# Patient Record
Sex: MALE | Race: WHITE | NOT HISPANIC OR LATINO | URBAN - METROPOLITAN AREA
[De-identification: names, ages, dates, MRNs, and addresses within clinical notes are randomized per-mention and may not be internally consistent; named-entity substitution may affect disease eponyms.]

---

## 2017-04-25 ENCOUNTER — APPOINTMENT (RX ONLY)
Dept: URBAN - METROPOLITAN AREA CLINIC 97 | Facility: CLINIC | Age: 57
Setting detail: DERMATOLOGY
End: 2017-04-25

## 2017-04-25 DIAGNOSIS — L81.4 OTHER MELANIN HYPERPIGMENTATION: ICD-10-CM

## 2017-04-25 DIAGNOSIS — L82.0 INFLAMED SEBORRHEIC KERATOSIS: ICD-10-CM

## 2017-04-25 DIAGNOSIS — L71.8 OTHER ROSACEA: ICD-10-CM

## 2017-04-25 PROCEDURE — ? COUNSELING

## 2017-04-25 PROCEDURE — 99202 OFFICE O/P NEW SF 15 MIN: CPT | Mod: 25

## 2017-04-25 PROCEDURE — 17110 DESTRUCTION B9 LES UP TO 14: CPT

## 2017-04-25 PROCEDURE — ? BENIGN DESTRUCTION

## 2017-04-25 ASSESSMENT — LOCATION DETAILED DESCRIPTION DERM
LOCATION DETAILED: RIGHT LATERAL MALAR CHEEK
LOCATION DETAILED: LEFT CENTRAL MALAR CHEEK
LOCATION DETAILED: RIGHT INFERIOR CENTRAL MALAR CHEEK
LOCATION DETAILED: RIGHT INFERIOR LATERAL MALAR CHEEK
LOCATION DETAILED: INFERIOR MID FOREHEAD
LOCATION DETAILED: RIGHT MEDIAL SUPERIOR CHEST
LOCATION DETAILED: LEFT INFERIOR CENTRAL MALAR CHEEK

## 2017-04-25 ASSESSMENT — LOCATION SIMPLE DESCRIPTION DERM
LOCATION SIMPLE: RIGHT CHEEK
LOCATION SIMPLE: INFERIOR FOREHEAD
LOCATION SIMPLE: LEFT CHEEK
LOCATION SIMPLE: CHEST

## 2017-04-25 ASSESSMENT — LOCATION ZONE DERM
LOCATION ZONE: TRUNK
LOCATION ZONE: FACE

## 2017-04-25 NOTE — PROCEDURE: BENIGN DESTRUCTION
Include Z78.9 (Other Specified Conditions Influencing Health Status) As An Associated Diagnosis?: No
Treatment Number (Will Not Render If 0): 0
Medical Necessity Clause: This procedure was medically necessary because the lesions that were treated were:
Medical Necessity Information: It is in your best interest to select a reason for this procedure from the list below. All of these items fulfill various CMS LCD requirements except the new and changing color options.
Post-Care Instructions: I reviewed with the patient in detail post-care instructions. Patient is to wear sunprotection, and avoid picking at any of the treated lesions. Pt may apply Vaseline to crusted or scabbing areas.
Render Post-Care Instructions In Note?: yes
Detail Level: Zone
Consent: The patient's consent was obtained including but not limited to risks of crusting, scabbing, blistering, scarring, darker or lighter pigmentary change, recurrence, incomplete removal and infection.

## 2017-05-18 ENCOUNTER — APPOINTMENT (RX ONLY)
Dept: URBAN - METROPOLITAN AREA CLINIC 97 | Facility: CLINIC | Age: 57
Setting detail: DERMATOLOGY
End: 2017-05-18

## 2017-05-18 DIAGNOSIS — L71.8 OTHER ROSACEA: ICD-10-CM

## 2017-05-18 DIAGNOSIS — Z41.9 ENCOUNTER FOR PROCEDURE FOR PURPOSES OTHER THAN REMEDYING HEALTH STATE, UNSPECIFIED: ICD-10-CM

## 2017-05-18 DIAGNOSIS — B07.8 OTHER VIRAL WARTS: ICD-10-CM

## 2017-05-18 PROCEDURE — 17111 DESTRUCTION B9 LESIONS 15/>: CPT

## 2017-05-18 PROCEDURE — ? MEDICAL CONSULTATION: PULSED-DYE LASER

## 2017-05-18 PROCEDURE — ? LIQUID NITROGEN

## 2017-05-18 PROCEDURE — ? COUNSELING

## 2017-05-18 PROCEDURE — ? TREATMENT REGIMEN

## 2017-05-18 PROCEDURE — ? CHEMICAL PEEL JESSNER/TCA

## 2017-05-18 PROCEDURE — 99214 OFFICE O/P EST MOD 30 MIN: CPT | Mod: 25

## 2017-05-18 ASSESSMENT — LOCATION ZONE DERM
LOCATION ZONE: HAND
LOCATION ZONE: FACE
LOCATION ZONE: ARM

## 2017-05-18 ASSESSMENT — LOCATION SIMPLE DESCRIPTION DERM
LOCATION SIMPLE: RIGHT HAND
LOCATION SIMPLE: LEFT ELBOW
LOCATION SIMPLE: RIGHT FOREARM
LOCATION SIMPLE: RIGHT UPPER ARM
LOCATION SIMPLE: RIGHT WRIST
LOCATION SIMPLE: LEFT CHEEK
LOCATION SIMPLE: LEFT UPPER ARM
LOCATION SIMPLE: LEFT HAND
LOCATION SIMPLE: LEFT FOREARM

## 2017-05-18 ASSESSMENT — LOCATION DETAILED DESCRIPTION DERM
LOCATION DETAILED: LEFT ULNAR DORSAL HAND
LOCATION DETAILED: LEFT PROXIMAL DORSAL FOREARM
LOCATION DETAILED: RIGHT PROXIMAL POSTERIOR UPPER ARM
LOCATION DETAILED: RIGHT PROXIMAL DORSAL FOREARM
LOCATION DETAILED: LEFT DORSAL MIDDLE METACARPOPHALANGEAL JOINT
LOCATION DETAILED: LEFT SUPERIOR MEDIAL BUCCAL CHEEK
LOCATION DETAILED: RIGHT DISTAL DORSAL FOREARM
LOCATION DETAILED: LEFT CENTRAL MALAR CHEEK
LOCATION DETAILED: LEFT DISTAL DORSAL FOREARM
LOCATION DETAILED: RIGHT DISTAL POSTERIOR UPPER ARM
LOCATION DETAILED: RIGHT ULNAR DORSAL HAND
LOCATION DETAILED: 2ND WEB SPACE LEFT HAND
LOCATION DETAILED: LEFT ELBOW
LOCATION DETAILED: RIGHT RADIAL DORSAL HAND
LOCATION DETAILED: RIGHT PROXIMAL RADIAL DORSAL FOREARM
LOCATION DETAILED: RIGHT DORSAL WRIST
LOCATION DETAILED: LEFT PROXIMAL ULNAR DORSAL FOREARM
LOCATION DETAILED: LEFT DISTAL POSTERIOR UPPER ARM

## 2017-05-18 ASSESSMENT — AREA OF WARTS IN CM2: TOTAL AREA OF ALL WARTS IN CM2: 20

## 2017-05-18 NOTE — PROCEDURE: MIPS QUALITY
Detail Level: Zone
Quality 431: Preventive Care And Screening: Unhealthy Alcohol Use - Screening: Patient screened for unhealthy alcohol use using a single question and scores less than 2 times per year
Quality 226: Preventive Care And Screening: Tobacco Use: Screening And Cessation Intervention: Patient screened for tobacco and never smoked
Quality 130: Documentation Of Current Medications In The Medical Record: Current Medications not Documented, Patient not Eligible

## 2017-05-18 NOTE — PROCEDURE: LIQUID NITROGEN
Detail Level: Zone
Add 52 Modifier (Optional): no
Post-Care Instructions: I reviewed with the patient in detail post-care instructions. Patient is to wear sunprotection, and avoid picking at any of the treated lesions. Pt may apply Vaseline to crusted or scabbing areas.
Duration Of Freeze Thaw-Cycle (Seconds): 5
Total Number Of Lesions Treated: [address] [facility]
Number Of Freeze-Thaw Cycles: 2 freeze-thaw cycles
Medical Necessity Information: It is in your best interest to select a reason for this procedure from the list below. All of these items fulfill various CMS LCD requirements except the new and changing color options.
Consent: The patient's consent was obtained including but not limited to risks of crusting, scabbing, blistering, scarring, darker or lighter pigmentary change, recurrence, incomplete removal and infection.
Medical Necessity Clause: This procedure was medically necessary because the lesions that were treated were:

## 2017-08-16 ENCOUNTER — APPOINTMENT (RX ONLY)
Dept: URBAN - METROPOLITAN AREA CLINIC 97 | Facility: CLINIC | Age: 57
Setting detail: DERMATOLOGY
End: 2017-08-16

## 2017-08-16 DIAGNOSIS — B07.8 OTHER VIRAL WARTS: ICD-10-CM

## 2017-08-16 DIAGNOSIS — L71.8 OTHER ROSACEA: ICD-10-CM

## 2017-08-16 DIAGNOSIS — Z41.9 ENCOUNTER FOR PROCEDURE FOR PURPOSES OTHER THAN REMEDYING HEALTH STATE, UNSPECIFIED: ICD-10-CM

## 2017-08-16 PROCEDURE — ? CHEMICAL PEEL JESSNER/TCA

## 2017-08-16 PROCEDURE — ? PRESCRIPTION

## 2017-08-16 PROCEDURE — ? COUNSELING

## 2017-08-16 PROCEDURE — 17110 DESTRUCTION B9 LES UP TO 14: CPT

## 2017-08-16 PROCEDURE — 99213 OFFICE O/P EST LOW 20 MIN: CPT | Mod: 25

## 2017-08-16 PROCEDURE — ? LIQUID NITROGEN

## 2017-08-16 ASSESSMENT — LOCATION DETAILED DESCRIPTION DERM
LOCATION DETAILED: RIGHT LATERAL MALAR CHEEK
LOCATION DETAILED: LEFT PROXIMAL DORSAL FOREARM
LOCATION DETAILED: RIGHT VENTRAL PROXIMAL FOREARM
LOCATION DETAILED: LEFT VENTRAL DISTAL FOREARM
LOCATION DETAILED: RIGHT ANTERIOR DISTAL UPPER ARM
LOCATION DETAILED: LEFT VENTRAL PROXIMAL FOREARM
LOCATION DETAILED: RIGHT CENTRAL MALAR CHEEK
LOCATION DETAILED: LEFT INFERIOR CENTRAL MALAR CHEEK
LOCATION DETAILED: RIGHT INFERIOR CENTRAL MALAR CHEEK
LOCATION DETAILED: LEFT DISTAL POSTERIOR UPPER ARM
LOCATION DETAILED: LEFT CENTRAL MALAR CHEEK
LOCATION DETAILED: RIGHT PROXIMAL DORSAL FOREARM

## 2017-08-16 ASSESSMENT — LOCATION ZONE DERM
LOCATION ZONE: FACE
LOCATION ZONE: ARM
LOCATION ZONE: FACE

## 2017-08-16 ASSESSMENT — LOCATION SIMPLE DESCRIPTION DERM
LOCATION SIMPLE: LEFT FOREARM
LOCATION SIMPLE: RIGHT UPPER ARM
LOCATION SIMPLE: LEFT UPPER ARM
LOCATION SIMPLE: RIGHT CHEEK
LOCATION SIMPLE: RIGHT CHEEK
LOCATION SIMPLE: RIGHT FOREARM
LOCATION SIMPLE: LEFT CHEEK

## 2017-08-16 ASSESSMENT — AREA OF WARTS IN CM2: TOTAL AREA OF ALL WARTS IN CM2: 10

## 2017-08-16 NOTE — PROCEDURE: MIPS QUALITY
Quality 130: Documentation Of Current Medications In The Medical Record: Current Medications Documented
Quality 431: Preventive Care And Screening: Unhealthy Alcohol Use - Screening: Patient screened for unhealthy alcohol use using a single question and scores less than 2 times per year
Quality 402: Tobacco Use And Help With Quitting Among Adolescents: Patient screened for tobacco and never smoked
Detail Level: Zone

## 2017-08-16 NOTE — PROCEDURE: LIQUID NITROGEN
Medical Necessity Clause: This procedure was medically necessary because the lesions that were treated were:
Include Z78.9 (Other Specified Conditions Influencing Health Status) As An Associated Diagnosis?: No
Consent: The patient's consent was obtained including but not limited to risks of crusting, scabbing, blistering, scarring, darker or lighter pigmentary change, recurrence, incomplete removal and infection.
Detail Level: Zone
Number Of Freeze-Thaw Cycles: 1 freeze-thaw cycle
Duration Of Freeze Thaw-Cycle (Seconds): 5-10
Post-Care Instructions: I reviewed with the patient in detail post-care instructions. Patient is to wear sunprotection, and avoid picking at any of the treated lesions. Pt may apply Vaseline to crusted or scabbing areas.
Medical Necessity Information: It is in your best interest to select a reason for this procedure from the list below. All of these items fulfill various CMS LCD requirements except the new and changing color options.
Render Post-Care Instructions In Note?: yes

## 2017-08-24 RX ORDER — OXYMETAZOLINE HYDROCHLORIDE 10 MG/G
CREAM TOPICAL
Qty: 1 | Refills: 0

## 2017-11-09 ENCOUNTER — APPOINTMENT (RX ONLY)
Dept: URBAN - METROPOLITAN AREA CLINIC 97 | Facility: CLINIC | Age: 57
Setting detail: DERMATOLOGY
End: 2017-11-09

## 2017-11-09 DIAGNOSIS — L70.0 ACNE VULGARIS: ICD-10-CM

## 2017-11-09 PROCEDURE — ? ACNE SURGERY

## 2017-11-09 PROCEDURE — 10040 EXTRACTION: CPT

## 2017-11-09 ASSESSMENT — LOCATION DETAILED DESCRIPTION DERM: LOCATION DETAILED: LEFT CENTRAL MALAR CHEEK

## 2017-11-09 ASSESSMENT — LOCATION SIMPLE DESCRIPTION DERM: LOCATION SIMPLE: LEFT CHEEK

## 2017-11-09 ASSESSMENT — LOCATION ZONE DERM: LOCATION ZONE: FACE

## 2017-11-09 NOTE — PROCEDURE: ACNE SURGERY
Prep Text (Optional): Prior to removal the treatment areas were prepped in the usual fashion.
Acne Type: Comedonal Lesions
Extraction Method: cotton-tipped applicators and gentle pressure
Render Post-Care Instructions In Note?: no
Render Number Of Lesions Treated: yes
Post-Care Instructions: I reviewed with the patient in detail post-care instructions. Patient is to keep the treatment areas dry overnight, and then apply bacitracin twice daily until healed. Patient may apply hydrogen peroxide soaks to remove any crusting.
Consent was obtained and risks were reviewed including but not limited to scarring, infection, bleeding, scabbing, incomplete removal, and allergy to anesthesia.
Detail Level: Zone

## 2017-12-07 ENCOUNTER — APPOINTMENT (RX ONLY)
Dept: URBAN - METROPOLITAN AREA CLINIC 97 | Facility: CLINIC | Age: 57
Setting detail: DERMATOLOGY
End: 2017-12-07

## 2017-12-07 DIAGNOSIS — L70.0 ACNE VULGARIS: ICD-10-CM

## 2017-12-07 PROCEDURE — 10040 EXTRACTION: CPT

## 2017-12-07 PROCEDURE — ? ACNE SURGERY

## 2017-12-07 ASSESSMENT — LOCATION SIMPLE DESCRIPTION DERM: LOCATION SIMPLE: LEFT CHEEK

## 2017-12-07 ASSESSMENT — LOCATION DETAILED DESCRIPTION DERM: LOCATION DETAILED: LEFT LATERAL MALAR CHEEK

## 2017-12-07 ASSESSMENT — LOCATION ZONE DERM: LOCATION ZONE: FACE

## 2017-12-07 NOTE — PROCEDURE: ACNE SURGERY
Consent was obtained and risks were reviewed including but not limited to scarring, infection, bleeding, scabbing, incomplete removal, and allergy to anesthesia.
Extraction Method: cotton-tipped applicators and gentle pressure
Acne Type: Comedonal Lesions
Render Number Of Lesions Treated: yes
Post-Care Instructions: I reviewed with the patient in detail post-care instructions. Patient is to keep the treatment areas dry overnight, and then apply bacitracin twice daily until healed. Patient may apply hydrogen peroxide soaks to remove any crusting.
Detail Level: Zone
Prep Text (Optional): Prior to removal the treatment areas were prepped in the usual fashion.
Render Post-Care Instructions In Note?: no

## 2017-12-22 ENCOUNTER — APPOINTMENT (RX ONLY)
Dept: URBAN - METROPOLITAN AREA CLINIC 97 | Facility: CLINIC | Age: 57
Setting detail: DERMATOLOGY
End: 2017-12-22

## 2017-12-22 DIAGNOSIS — L71.8 OTHER ROSACEA: ICD-10-CM

## 2017-12-22 DIAGNOSIS — L81.4 OTHER MELANIN HYPERPIGMENTATION: ICD-10-CM

## 2017-12-22 DIAGNOSIS — D22 MELANOCYTIC NEVI: ICD-10-CM

## 2017-12-22 DIAGNOSIS — Z41.9 ENCOUNTER FOR PROCEDURE FOR PURPOSES OTHER THAN REMEDYING HEALTH STATE, UNSPECIFIED: ICD-10-CM

## 2017-12-22 DIAGNOSIS — B07.8 OTHER VIRAL WARTS: ICD-10-CM

## 2017-12-22 PROBLEM — D22.62 MELANOCYTIC NEVI OF LEFT UPPER LIMB, INCLUDING SHOULDER: Status: ACTIVE | Noted: 2017-12-22

## 2017-12-22 PROCEDURE — 99213 OFFICE O/P EST LOW 20 MIN: CPT | Mod: 25

## 2017-12-22 PROCEDURE — 17110 DESTRUCTION B9 LES UP TO 14: CPT

## 2017-12-22 PROCEDURE — ? PRESCRIPTION

## 2017-12-22 PROCEDURE — ? COUNSELING

## 2017-12-22 PROCEDURE — ? CHEMICAL PEEL JESSNER/TCA

## 2017-12-22 PROCEDURE — ? BENIGN DESTRUCTION

## 2017-12-22 RX ORDER — OXYMETAZOLINE HYDROCHLORIDE 10 MG/G
CREAM TOPICAL
Qty: 1 | Refills: 0 | Status: ERX | COMMUNITY
Start: 2017-12-22

## 2017-12-22 RX ORDER — METRONIDAZOLE 10 MG/G
GEL TOPICAL
Qty: 1 | Refills: 0 | Status: ERX | COMMUNITY
Start: 2017-12-22

## 2017-12-22 RX ADMIN — METRONIDAZOLE: 10 GEL TOPICAL at 20:05

## 2017-12-22 RX ADMIN — OXYMETAZOLINE HYDROCHLORIDE: 10 CREAM TOPICAL at 20:05

## 2017-12-22 ASSESSMENT — LOCATION ZONE DERM
LOCATION ZONE: FACE
LOCATION ZONE: FACE
LOCATION ZONE: ARM

## 2017-12-22 ASSESSMENT — LOCATION DETAILED DESCRIPTION DERM
LOCATION DETAILED: SUPERIOR MID FOREHEAD
LOCATION DETAILED: LEFT CENTRAL MALAR CHEEK
LOCATION DETAILED: LEFT CENTRAL MALAR CHEEK
LOCATION DETAILED: LEFT SUPERIOR LATERAL BUCCAL CHEEK
LOCATION DETAILED: RIGHT INFERIOR CENTRAL MALAR CHEEK
LOCATION DETAILED: LEFT ANTERIOR PROXIMAL UPPER ARM

## 2017-12-22 ASSESSMENT — LOCATION SIMPLE DESCRIPTION DERM
LOCATION SIMPLE: SUPERIOR FOREHEAD
LOCATION SIMPLE: LEFT CHEEK
LOCATION SIMPLE: LEFT CHEEK
LOCATION SIMPLE: RIGHT CHEEK
LOCATION SIMPLE: LEFT UPPER ARM

## 2017-12-22 NOTE — PROCEDURE: MIPS QUALITY
Quality 130: Documentation Of Current Medications In The Medical Record: Current Medications Documented
Quality 110: Preventive Care And Screening: Influenza Immunization: Influenza Immunization Ordered or Recommended, but not Administered due to system reason
Detail Level: Zone
Quality 402: Tobacco Use And Help With Quitting Among Adolescents: Patient screened for tobacco and never smoked
Quality 431: Preventive Care And Screening: Unhealthy Alcohol Use - Screening: Patient screened for unhealthy alcohol use using a single question and scores less than 2 times per year

## 2017-12-22 NOTE — PROCEDURE: BENIGN DESTRUCTION
Post-Care Instructions: I reviewed with the patient in detail post-care instructions. Patient is to wear sunprotection, and avoid picking at any of the treated lesions. Pt may apply Vaseline to crusted or scabbing areas.
Anesthesia Volume In Cc: 0
Detail Level: Zone
Add 52 Modifier (Optional): no
Medical Necessity Information: It is in your best interest to select a reason for this procedure from the list below. All of these items fulfill various CMS LCD requirements except the new and changing color options.
Medical Necessity Clause: This procedure was medically necessary because the lesions that were treated were:
Total Number Of Lesions Treated: 8
Consent: The patient's consent was obtained including but not limited to risks of crusting, scabbing, blistering, scarring, darker or lighter pigmentary change, recurrence, incomplete removal and infection.

## 2017-12-22 NOTE — PROCEDURE: CHEMICAL PEEL JESSNER/TCA
Chemical Peel: 10% TCA
Detail Level: Zone
Treatment Number: 0
Post-Care Instructions: I reviewed with the patient in detail post-care instructions. Patient should avoid sun exposure and wear sun protection.
Prep: The treated area was degreased with pre-peel cleanser, and vaseline was applied for protection of mucous membranes.
Time (Mins): 2
Post Peel Care: After the procedure, the treatment area was washed with soap and water, and a post-peel cream was applied. Sun protection and post-care instructions were reviewed with the patient. Chemical peel done by Ha Mack.
Consent: Prior to the procedure, written consent was obtained and risks were reviewed, including but not limited to: redness, peeling, blistering, pigmentary change, scarring, infection, and pain.

## 2018-01-16 ENCOUNTER — APPOINTMENT (RX ONLY)
Dept: URBAN - METROPOLITAN AREA CLINIC 97 | Facility: CLINIC | Age: 58
Setting detail: DERMATOLOGY
End: 2018-01-16

## 2018-01-16 DIAGNOSIS — L70.0 ACNE VULGARIS: ICD-10-CM

## 2018-01-16 PROCEDURE — ? ACNE SURGERY

## 2018-01-16 PROCEDURE — 10040 EXTRACTION: CPT

## 2018-01-16 ASSESSMENT — LOCATION DETAILED DESCRIPTION DERM: LOCATION DETAILED: LEFT INFERIOR CENTRAL MALAR CHEEK

## 2018-01-16 ASSESSMENT — LOCATION SIMPLE DESCRIPTION DERM: LOCATION SIMPLE: LEFT CHEEK

## 2018-01-16 ASSESSMENT — LOCATION ZONE DERM: LOCATION ZONE: FACE

## 2018-01-16 NOTE — PROCEDURE: ACNE SURGERY
Detail Level: Zone
Post-Care Instructions: I reviewed with the patient in detail post-care instructions. Patient is to keep the treatment areas dry overnight, and then apply bacitracin twice daily until healed. Patient may apply hydrogen peroxide soaks to remove any crusting.
Acne Type: Comedonal Lesions
Prep Text (Optional): Prior to removal the treatment areas were prepped in the usual fashion.
Consent was obtained and risks were reviewed including but not limited to scarring, infection, bleeding, scabbing, incomplete removal, and allergy to anesthesia.
Render Post-Care Instructions In Note?: no
Render Number Of Lesions Treated: yes
Extraction Method: cotton-tipped applicators and gentle pressure

## 2018-02-02 ENCOUNTER — APPOINTMENT (RX ONLY)
Dept: URBAN - METROPOLITAN AREA CLINIC 97 | Facility: CLINIC | Age: 58
Setting detail: DERMATOLOGY
End: 2018-02-02

## 2018-02-02 DIAGNOSIS — B07.8 OTHER VIRAL WARTS: ICD-10-CM

## 2018-02-02 DIAGNOSIS — Z41.9 ENCOUNTER FOR PROCEDURE FOR PURPOSES OTHER THAN REMEDYING HEALTH STATE, UNSPECIFIED: ICD-10-CM

## 2018-02-02 DIAGNOSIS — L71.8 OTHER ROSACEA: ICD-10-CM

## 2018-02-02 PROCEDURE — ? TREATMENT REGIMEN

## 2018-02-02 PROCEDURE — ? BENIGN DESTRUCTION

## 2018-02-02 PROCEDURE — ? COUNSELING

## 2018-02-02 PROCEDURE — 99213 OFFICE O/P EST LOW 20 MIN: CPT | Mod: 25

## 2018-02-02 PROCEDURE — 17111 DESTRUCTION B9 LESIONS 15/>: CPT

## 2018-02-02 PROCEDURE — ? CHEMICAL PEEL JESSNER/TCA

## 2018-02-02 PROCEDURE — ? COSMETIC QUOTE

## 2018-02-02 ASSESSMENT — LOCATION DETAILED DESCRIPTION DERM
LOCATION DETAILED: RIGHT CENTRAL MALAR CHEEK
LOCATION DETAILED: LEFT INFERIOR CENTRAL MALAR CHEEK
LOCATION DETAILED: LEFT CENTRAL MALAR CHEEK
LOCATION DETAILED: RIGHT INFERIOR CENTRAL MALAR CHEEK

## 2018-02-02 ASSESSMENT — LOCATION SIMPLE DESCRIPTION DERM
LOCATION SIMPLE: LEFT CHEEK
LOCATION SIMPLE: RIGHT CHEEK

## 2018-02-02 ASSESSMENT — LOCATION ZONE DERM: LOCATION ZONE: FACE

## 2018-02-02 NOTE — PROCEDURE: MIPS QUALITY
Quality 226: Preventive Care And Screening: Tobacco Use: Screening And Cessation Intervention: Patient screened for tobacco and never smoked
Quality 130: Documentation Of Current Medications In The Medical Record: Current Medications Documented
Detail Level: Zone
Quality 431: Preventive Care And Screening: Unhealthy Alcohol Use - Screening: Patient screened for unhealthy alcohol use using a single question and scores less than 2 times per year
Quality 110: Preventive Care And Screening: Influenza Immunization: Influenza Immunization Administered during Influenza season

## 2018-02-02 NOTE — PROCEDURE: BENIGN DESTRUCTION
Total Number Of Lesions Treated: [address] [facility]
Medical Necessity Information: It is in your best interest to select a reason for this procedure from the list below. All of these items fulfill various CMS LCD requirements except the new and changing color options.
Include Z78.9 (Other Specified Conditions Influencing Health Status) As An Associated Diagnosis?: No
Post-Care Instructions: I reviewed with the patient in detail post-care instructions. Patient is to wear sunprotection, and avoid picking at any of the treated lesions. Pt may apply Vaseline to crusted or scabbing areas.
Consent: The patient's consent was obtained including but not limited to risks of crusting, scabbing, blistering, scarring, darker or lighter pigmentary change, recurrence, incomplete removal and infection.
Anesthesia Volume In Cc: 0
Detail Level: Zone
Medical Necessity Clause: This procedure was medically necessary because the lesions that were treated were:

## 2018-02-02 NOTE — PROCEDURE: COSMETIC QUOTE
Sculptra Price Per Syringe: [address]
Detail Level: Simple
Botox Price Per Unit: 15
Perlane Price Per Syringe: 500
Notice: We have created a more complete Cosmetic Quote plan. The procedure name is also Cosmetic Quote. Please review the new plan and hide the Cosmetic Quote plan you do not want to use.
Discount Percentage: 0

## 2018-02-02 NOTE — PROCEDURE: CHEMICAL PEEL JESSNER/TCA
Post-Care Instructions: I reviewed with the patient in detail post-care instructions. Patient should avoid sun exposure and wear sun protection.
Time (Mins): 2
Frost (0,1+,2+,3+,4+): 0
Consent: Prior to the procedure, written consent was obtained and risks were reviewed, including but not limited to: redness, peeling, blistering, pigmentary change, scarring, infection, and pain.
Chemical Peel: 10% TCA
Prep: The treated area was degreased with pre-peel cleanser, and vaseline was applied for protection of mucous membranes.
Detail Level: Zone
Post Peel Care: After the procedure, the treatment area was washed with soap and water, and a post-peel cream was applied. Sun protection and post-care instructions were reviewed with the patient.

## 2018-02-21 ENCOUNTER — APPOINTMENT (RX ONLY)
Dept: URBAN - METROPOLITAN AREA CLINIC 97 | Facility: CLINIC | Age: 58
Setting detail: DERMATOLOGY
End: 2018-02-21

## 2018-02-21 DIAGNOSIS — L70.0 ACNE VULGARIS: ICD-10-CM

## 2018-02-21 PROCEDURE — 10040 EXTRACTION: CPT

## 2018-02-21 PROCEDURE — ? ACNE SURGERY

## 2018-02-21 ASSESSMENT — LOCATION ZONE DERM: LOCATION ZONE: FACE

## 2018-02-21 ASSESSMENT — LOCATION DETAILED DESCRIPTION DERM: LOCATION DETAILED: LEFT CENTRAL MALAR CHEEK

## 2018-02-21 ASSESSMENT — LOCATION SIMPLE DESCRIPTION DERM: LOCATION SIMPLE: LEFT CHEEK

## 2018-02-21 NOTE — PROCEDURE: ACNE SURGERY
Extraction Method: cotton-tipped applicators and gentle pressure
Consent was obtained and risks were reviewed including but not limited to scarring, infection, bleeding, scabbing, incomplete removal, and allergy to anesthesia.
Acne Type: Comedonal Lesions
Post-Care Instructions: I reviewed with the patient in detail post-care instructions. Patient is to keep the treatment areaas dry overnight, and then apply bacitracin twice daily until healed. Patient may apply hydrogen peroxide soaks to remove any crusting.
Render Number Of Lesions Treated: no
Hemostasis: Pressure
Render Post-Care Instructions In Note?: yes
Detail Level: Zone

## 2018-03-22 ENCOUNTER — APPOINTMENT (RX ONLY)
Dept: URBAN - METROPOLITAN AREA CLINIC 47 | Facility: CLINIC | Age: 58
Setting detail: DERMATOLOGY
End: 2018-03-22

## 2018-03-22 DIAGNOSIS — Z41.9 ENCOUNTER FOR PROCEDURE FOR PURPOSES OTHER THAN REMEDYING HEALTH STATE, UNSPECIFIED: ICD-10-CM

## 2018-03-22 DIAGNOSIS — L71.8 OTHER ROSACEA: ICD-10-CM

## 2018-03-22 PROCEDURE — 99213 OFFICE O/P EST LOW 20 MIN: CPT

## 2018-03-22 PROCEDURE — ? COUNSELING

## 2018-03-22 PROCEDURE — ? CHEMICAL PEEL JESSNER/TCA

## 2018-03-22 ASSESSMENT — LOCATION SIMPLE DESCRIPTION DERM
LOCATION SIMPLE: RIGHT CHEEK
LOCATION SIMPLE: LEFT CHEEK

## 2018-03-22 ASSESSMENT — LOCATION DETAILED DESCRIPTION DERM
LOCATION DETAILED: RIGHT INFERIOR CENTRAL MALAR CHEEK
LOCATION DETAILED: LEFT INFERIOR CENTRAL MALAR CHEEK

## 2018-03-22 ASSESSMENT — LOCATION ZONE DERM: LOCATION ZONE: FACE

## 2018-03-22 NOTE — PROCEDURE: CHEMICAL PEEL JESSNER/TCA
Post-Care Instructions: I reviewed with the patient in detail post-care instructions. Patient should avoid sun exposure and wear sun protection.
Number Of Coats: 2
Chemical Peel: 15% TCA
Post Peel Care: After the procedure, the treatment area was washed with soap and water, and a post-peel cream was applied. Sun protection and post-care instructions were reviewed with the patient.
Frost (0,1+,2+,3+,4+): 0
Prep: The treated area was degreased with pre-peel cleanser, and vaseline was applied for protection of mucous membranes.
Consent: Prior to the procedure, written consent was obtained and risks were reviewed, including but not limited to: redness, peeling, blistering, pigmentary change, scarring, infection, and pain.
Detail Level: Zone

## 2019-07-30 ENCOUNTER — APPOINTMENT (RX ONLY)
Dept: URBAN - METROPOLITAN AREA CLINIC 47 | Facility: CLINIC | Age: 59
Setting detail: DERMATOLOGY
End: 2019-07-30

## 2019-07-30 DIAGNOSIS — L71.8 OTHER ROSACEA: ICD-10-CM

## 2019-07-30 DIAGNOSIS — Z41.9 ENCOUNTER FOR PROCEDURE FOR PURPOSES OTHER THAN REMEDYING HEALTH STATE, UNSPECIFIED: ICD-10-CM

## 2019-07-30 DIAGNOSIS — D22 MELANOCYTIC NEVI: ICD-10-CM

## 2019-07-30 DIAGNOSIS — L82.0 INFLAMED SEBORRHEIC KERATOSIS: ICD-10-CM

## 2019-07-30 PROBLEM — D22.9 MELANOCYTIC NEVI, UNSPECIFIED: Status: ACTIVE | Noted: 2019-07-30

## 2019-07-30 PROCEDURE — ? CHEMICAL PEEL JESSNER/TCA

## 2019-07-30 PROCEDURE — ? PRESCRIPTION

## 2019-07-30 PROCEDURE — ? COUNSELING

## 2019-07-30 PROCEDURE — 99213 OFFICE O/P EST LOW 20 MIN: CPT

## 2019-07-30 RX ORDER — IVERMECTIN 10 MG/G
CREAM TOPICAL
Qty: 1 | Refills: 0 | Status: ERX | COMMUNITY
Start: 2019-07-30

## 2019-07-30 RX ADMIN — IVERMECTIN: 10 CREAM TOPICAL at 23:38

## 2019-07-30 ASSESSMENT — LOCATION ZONE DERM: LOCATION ZONE: FACE

## 2019-07-30 ASSESSMENT — LOCATION SIMPLE DESCRIPTION DERM: LOCATION SIMPLE: LEFT CHEEK

## 2019-07-30 ASSESSMENT — LOCATION DETAILED DESCRIPTION DERM: LOCATION DETAILED: LEFT CENTRAL MALAR CHEEK

## 2019-07-30 NOTE — PROCEDURE: CHEMICAL PEEL JESSNER/TCA
Consent: Prior to the procedure, written consent was obtained and risks were reviewed, including but not limited to: redness, peeling, blistering, pigmentary change, scarring, infection, and pain.
Chemical Peel: 10% TCA
Treatment Number: 0
Number Of Coats: 2
Post Peel Care: After the procedure, the treatment area was washed with soap and water, and a post-peel cream was applied. Sun protection and post-care instructions were reviewed with the patient.
Post-Care Instructions: I reviewed with the patient in detail post-care instructions. Patient should avoid sun exposure and wear sun protection.
Prep: The treated area was degreased with pre-peel cleanser, and vaseline was applied for protection of mucous membranes.
Detail Level: Zone

## 2019-07-30 NOTE — PROCEDURE: MIPS QUALITY
Detail Level: Simple
Quality 130: Documentation Of Current Medications In The Medical Record: Current Medications Documented
Quality 226: Preventive Care And Screening: Tobacco Use: Screening And Cessation Intervention: Patient screened for tobacco use and is an ex/non-smoker
Quality 431: Preventive Care And Screening: Unhealthy Alcohol Use - Screening: Patient screened for unhealthy alcohol use using a single question and scores less than 2 times per year
Quality 110: Preventive Care And Screening: Influenza Immunization: Influenza Immunization not Administered for Documented Reasons.
(0) understands/communicates without difficulty

## 2020-02-13 ENCOUNTER — APPOINTMENT (RX ONLY)
Dept: URBAN - METROPOLITAN AREA CLINIC 47 | Facility: CLINIC | Age: 60
Setting detail: DERMATOLOGY
End: 2020-02-13

## 2020-02-13 ENCOUNTER — RX ONLY (OUTPATIENT)
Age: 60
Setting detail: RX ONLY
End: 2020-02-13

## 2020-02-13 DIAGNOSIS — Z41.9 ENCOUNTER FOR PROCEDURE FOR PURPOSES OTHER THAN REMEDYING HEALTH STATE, UNSPECIFIED: ICD-10-CM

## 2020-02-13 DIAGNOSIS — L82.0 INFLAMED SEBORRHEIC KERATOSIS: ICD-10-CM

## 2020-02-13 DIAGNOSIS — L71.8 OTHER ROSACEA: ICD-10-CM

## 2020-02-13 DIAGNOSIS — L57.0 ACTINIC KERATOSIS: ICD-10-CM

## 2020-02-13 PROCEDURE — ? LIQUID NITROGEN

## 2020-02-13 PROCEDURE — ? PRESCRIPTION

## 2020-02-13 PROCEDURE — ? COUNSELING

## 2020-02-13 PROCEDURE — 17110 DESTRUCTION B9 LES UP TO 14: CPT | Mod: 59

## 2020-02-13 PROCEDURE — ? COSMETIC QUOTE

## 2020-02-13 PROCEDURE — ? TCA CHEMICAL PEEL

## 2020-02-13 PROCEDURE — 17004 DESTROY PREMAL LESIONS 15/>: CPT

## 2020-02-13 PROCEDURE — 99213 OFFICE O/P EST LOW 20 MIN: CPT | Mod: 25

## 2020-02-13 RX ORDER — IVERMECTIN 10 MG/G
CREAM TOPICAL QHS
Qty: 1 | Refills: 0 | Status: ERX

## 2020-02-13 RX ORDER — OXYMETAZOLINE HYDROCHLORIDE 1 G/100G
CREAM TOPICAL
Qty: 1 | Refills: 0 | Status: ERX

## 2020-02-13 ASSESSMENT — LOCATION ZONE DERM
LOCATION ZONE: FACE
LOCATION ZONE: FACE
LOCATION ZONE: ARM
LOCATION ZONE: ARM
LOCATION ZONE: SCALP
LOCATION ZONE: SCALP

## 2020-02-13 ASSESSMENT — LOCATION DETAILED DESCRIPTION DERM
LOCATION DETAILED: LEFT DISTAL POSTERIOR UPPER ARM
LOCATION DETAILED: RIGHT INFERIOR CENTRAL MALAR CHEEK
LOCATION DETAILED: LEFT CENTRAL MALAR CHEEK
LOCATION DETAILED: RIGHT CENTRAL MALAR CHEEK
LOCATION DETAILED: RIGHT DISTAL POSTERIOR UPPER ARM
LOCATION DETAILED: LEFT MEDIAL FOREHEAD
LOCATION DETAILED: LEFT INFERIOR CENTRAL MALAR CHEEK
LOCATION DETAILED: POSTERIOR MID-PARIETAL SCALP
LOCATION DETAILED: RIGHT PROXIMAL DORSAL FOREARM
LOCATION DETAILED: LEFT DISTAL POSTERIOR UPPER ARM
LOCATION DETAILED: INFERIOR MID FOREHEAD
LOCATION DETAILED: RIGHT FOREHEAD
LOCATION DETAILED: LEFT INFERIOR CENTRAL MALAR CHEEK
LOCATION DETAILED: MID-FRONTAL SCALP
LOCATION DETAILED: LEFT CENTRAL MALAR CHEEK
LOCATION DETAILED: RIGHT ELBOW

## 2020-02-13 ASSESSMENT — LOCATION SIMPLE DESCRIPTION DERM
LOCATION SIMPLE: LEFT UPPER ARM
LOCATION SIMPLE: LEFT FOREHEAD
LOCATION SIMPLE: LEFT CHEEK
LOCATION SIMPLE: LEFT CHEEK
LOCATION SIMPLE: RIGHT FOREARM
LOCATION SIMPLE: POSTERIOR SCALP
LOCATION SIMPLE: INFERIOR FOREHEAD
LOCATION SIMPLE: RIGHT ELBOW
LOCATION SIMPLE: RIGHT FOREHEAD
LOCATION SIMPLE: RIGHT UPPER ARM
LOCATION SIMPLE: LEFT UPPER ARM
LOCATION SIMPLE: RIGHT CHEEK
LOCATION SIMPLE: ANTERIOR SCALP

## 2020-02-13 NOTE — PROCEDURE: LIQUID NITROGEN
Detail Level: Zone
Total Number Of Aks Treated: [address] [facility]
Number Of Freeze-Thaw Cycles: 2 freeze-thaw cycles
Duration Of Freeze Thaw-Cycle (Seconds): 3
Post-Care Instructions: I reviewed with the patient in detail post-care instructions. Patient is to wear sunprotection, and avoid picking at any of the treated lesions. Pt may apply Vaseline to crusted or scabbing areas.
Consent: The patient's consent was obtained including but not limited to risks of crusting, scabbing, blistering, scarring, darker or lighter pigmentary change, recurrence, incomplete removal and infection.
Render Post-Care Instructions In Note?: yes
Render In Bullet Format When Appropriate: No
Total Number Of Lesions Treated: 5
Medical Necessity Clause: This procedure was medically necessary because the lesions that were treated were:
Medical Necessity Information: It is in your best interest to select a reason for this procedure from the list below. All of these items fulfill various CMS LCD requirements except the new and changing color options.

## 2020-02-13 NOTE — PROCEDURE: COSMETIC QUOTE
Discount Percentage: 0
Xeomin Price Per Unit: 15
Omayra Price Per Syringe: [address]
Botox Price Per Unit: 11.50
Voluma Price Per Syringe: 500
Detail Level: Detailed
Notice: We have created a more complete Cosmetic Quote plan. The procedure name is also Cosmetic Quote. Please review the new plan and hide the Cosmetic Quote plan you do not want to use.

## 2020-02-13 NOTE — PROCEDURE: TCA CHEMICAL PEEL
Consent: Prior to the procedure, written consent was obtained and risks were reviewed, including but not limited to: redness, peeling, blistering, pigmentary change, scarring, infection, and pain.
Post Peel Care: After the procedure, the treatment area was washed with soap and water, and a post-peel cream was applied. Sun protection and post-care instructions were reviewed with the patient.
Treatment Number: 0
Detail Level: Zone
Frost (0,1+,2+,3+,4+): 1+
Time (Mins): 1
Post-Care Instructions: I reviewed with the patient in detail post-care instructions. Patient should avoid sun exposure and wear sun protection.
Prep: The treated area was degreased with pre-peel cleanser, and vaseline was applied for protection of mucous membranes.
Chemical Peel: 17.5% TCA

## 2020-02-13 NOTE — PROCEDURE: MIPS QUALITY
Quality 130: Documentation Of Current Medications In The Medical Record: Current Medications Documented
Quality 110: Preventive Care And Screening: Influenza Immunization: Influenza Immunization not Administered for Documented Reasons.
Quality 431: Preventive Care And Screening: Unhealthy Alcohol Use - Screening: Patient screened for unhealthy alcohol use using a single question and scores less than 2 times per year
Detail Level: Zone
Quality 226: Preventive Care And Screening: Tobacco Use: Screening And Cessation Intervention: Patient screened for tobacco use and is an ex/non-smoker

## 2021-01-01 NOTE — PROCEDURE: CHEMICAL PEEL JESSNER/TCA
Subjective     Objective     Current Facility-Administered Medications   Medication Dose Route Frequency Provider Last Rate Last Admin   • ipratropium-albuterol (DUONEB) 0.5-2.5 (3) MG/3ML nebulizer solution 3 mL  3 mL Nebulization 4x Daily Resp Donavon Haider MD   3 mL at 01/01/21 0708   • diphenhydrAMINE-zinc acetate (BENADRYL) 2-0.1 % cream   Topical TID PRN Donavon Haider MD   Given at 12/31/20 2100   • AMIODarone (PACERONE) tablet 100 mg  100 mg Oral Every Other Day Donavon Haider MD       • ascorbic acid (VITAMIN C) tablet 500 mg  500 mg Oral Daily Donavon Haider MD   500 mg at 12/31/20 1725   • aspirin (ECOTRIN) enteric coated tablet 81 mg  81 mg Oral Daily Donavon Haider MD   81 mg at 12/31/20 1724   • atorvastatin (LIPITOR) tablet 20 mg  20 mg Oral QHS Donavon Haider MD   20 mg at 12/31/20 2145   • metoPROLOL succinate (TOPROL-XL) ER tablet 100 mg  100 mg Oral Daily Donavon Haider MD   100 mg at 12/31/20 1724   • digoxin (LANOXIN) tablet 125 mcg  125 mcg Oral Every Other Day Donavon Haider MD       • enalapril (VASOTEC) tablet 2.5 mg  2.5 mg Oral BID Donavon Haider MD   2.5 mg at 12/31/20 2143   • gabapentin (NEURONTIN) capsule 300 mg  300 mg Oral BID Donavon Haider MD   300 mg at 12/31/20 2145   • spironolactone (ALDACTONE) tablet 12.5 mg  12.5 mg Oral Daily Donavon Haider MD   12.5 mg at 12/31/20 1724   • hydrALAZINE (APRESOLINE) injection 10 mg  10 mg Intravenous Q6H PRN Toney Kimball MD   10 mg at 01/01/21 0641   • furosemide (LASIX INJECT) injection 20 mg  20 mg Intravenous BID Rosalva Chau MD   20 mg at 12/31/20 1733   • ondansetron (ZOFRAN) injection 4 mg  4 mg Intravenous Q4H PRN Donavon Haider MD       • acetaminophen (TYLENOL) tablet 650 mg  650 mg Oral Q4H PRN Donavon Haider MD   650 mg at 12/31/20 2021   • HYDROcodone-acetaminophen (NORCO) 5-325 MG per tablet 1 tablet  1 tablet Oral Q4H PRN Donavon Haider MD   1 tablet at 12/31/20 1725   • prochlorperazine 
(COMPAZINE) injection 10 mg  10 mg Intravenous Q6H PRN Donavon Haider MD       • cefepime (MAXIPIME) 1,000 mg in sodium chloride 0.9 % 100 mL IVPB  1,000 mg Intravenous Q12H Donavon Haider  mL/hr at 12/31/20 2031 1,000 mg at 12/31/20 2031   • vancomycin 1,250 mg in sodium chloride 0.9% 250 mL IVPB  1,250 mg Intravenous Q24H Donavon Haider  mL/hr at 12/31/20 2140 1,250 mg at 12/31/20 2140   • VANCOMYCIN - PHARMACIST MONITORED   Does not apply See Admin Instructions Donavon Haider MD            I/O's    Intake/Output Summary (Last 24 hours) at 1/1/2021 0906  Last data filed at 1/1/2021 0700  Gross per 24 hour   Intake 81.29 ml   Output 3 ml   Net 78.29 ml       Last Recorded Vitals  Blood pressure 136/73, pulse 60, temperature 97.9 °F (36.6 °C), temperature source Oral, resp. rate 18, height 5' 9\" (1.753 m), weight 86.2 kg (190 lb 0.6 oz), SpO2 94 %.  Body mass index is 28.06 kg/m².    Physical Exam  Constitutional:       Appearance: Normal appearance.   HENT:      Head: Atraumatic.      Right Ear: External ear normal.      Left Ear: External ear normal.      Nose: Nose normal.   Eyes:      Extraocular Movements: Extraocular movements intact.   Neck:      Musculoskeletal: Neck supple.   Cardiovascular:      Rate and Rhythm: Normal rate and regular rhythm.      Comments: 3/6 systolic ejection murmur at base  Pulmonary:      Comments: Coarse breath sounds bilaterally  Abdominal:      Palpations: Abdomen is soft.      Tenderness: There is no abdominal tenderness.   Genitourinary:     Comments: Exam deferred  Musculoskeletal:         General: No swelling.   Skin:     General: Skin is warm.   Neurological:      General: No focal deficit present.      Mental Status: He is alert.   Psychiatric:         Mood and Affect: Mood normal.         Labs   Admission on 12/30/2020   Component Date Value Ref Range Status   • Ventricular Rate EKG/Min (BPM) 12/30/2020 61   Final   • Atrial Rate (BPM) 12/30/2020 60   
Final   • IN-Interval (MSEC) 12/30/2020 86   Final   • QRS-Interval (MSEC) 12/30/2020 162   Final   • QT-Interval (MSEC) 12/30/2020 468   Final   • QTc 12/30/2020 471   Final   • R Axis (Degrees) 12/30/2020 -83   Final   • T Axis (Degrees) 12/30/2020 100   Final   • REPORT TEXT 12/30/2020    Final                    Value:AV sequential or dual chamber electronic pacemaker  When compared with ECG of  11-DEC-2020 10:26,  Vent. rate  has decreased  BY  18 BPM  Confirmed by SANTIAGO LOCKHART, [facility] ([id number]) on 12/31/2020 7:19:58 AM     • Culture, Blood or Bone Marrow 12/30/2020 No Growth 1 Day.   Preliminary   • Culture, Blood or Bone Marrow 12/30/2020 No Growth 1 Day.   Preliminary   • Rapid SARS-COV-2 by PCR 12/30/2020 Not Detected  Not Detected / Detected / Presumptive Positive / Inhibitors present Final   • Isolation Guidelines 12/30/2020    Final    Do not use this test result as the sole decision-maker for discontinuation of isolation.   Clinical evaluation should be considered for other respiratory illness requiring transmission-based isolation.    •       No fever (<99.0 F/37.2 C) for at least 24 hours without the use of fever-reducing medications    AND  •       Respiratory symptoms have improved or resolved (e.g. cough, shortness of breath)     AND  •       COVID-19 negative test    See COVID-19 Deisolation Resource Guide   • Procedural Comment 12/30/2020    Final    SARS-CoV-2 nucleic acid has not been detected indicating the absence of COVID-19.       Testing was performed using the [employer] Xpert Xpress SARS-CoV-2 RT-PCR assay that has been given Emergency Use Authorization (EUA) by the [place] Food and Drug Administration (FDA).  These results are considered definitive and do not need to be confirmed by another method.   • WBC 12/30/2020 11.2* 4.2 - 11.0 K/mcL Final   • RBC 12/30/2020 3.71* 4.50 - 5.90 mil/mcL Final   • HGB 12/30/2020 11.6* 13.0 - 17.0 g/dL Final   • HCT 12/30/2020 36.7* 39.0 - 51.0 % Final   • 
Consent: Prior to the procedure, written consent was obtained and risks were reviewed, including but not limited to: redness, peeling, blistering, pigmentary change, scarring, infection, and pain.
MCV 12/30/2020 98.9  78.0 - 100.0 fl Final   • MCH 12/30/2020 31.3  26.0 - 34.0 pg Final   • MCHC 12/30/2020 31.6* 32.0 - 36.5 g/dL Final   • RDW-CV 12/30/2020 13.9  11.0 - 15.0 % Final   • PLT 12/30/2020 157  140 - 450 K/mcL Final   • NRBC 12/30/2020 0  <=0 /100 WBC Final   • Neutrophil, Percent 12/30/2020 81  % Final   • Lymphocytes, Percent 12/30/2020 6  % Final   • Mono, Percent 12/30/2020 11  % Final   • Eosinophils, Percent 12/30/2020 2  % Final   • Basophils, Percent 12/30/2020 0  % Final   • Immature Granulocytes 12/30/2020 0  % Final   • Absolute Neutrophils 12/30/2020 9.0* 1.8 - 7.7 K/mcL Final   • Absolute Lymphocytes 12/30/2020 0.7* 1.0 - 4.0 K/mcL Final   • Absolute Monocytes 12/30/2020 1.2* 0.3 - 0.9 K/mcL Final   • Absolute Eosinophils  12/30/2020 0.3  0.0 - 0.5 K/mcL Final   • Absolute Basophils 12/30/2020 0.0  0.0 - 0.3 K/mcL Final   • Absolute Immmature Granulocytes 12/30/2020 0.1  0.0 - 0.2 K/mcL Final   • RDW-SD 12/30/2020 49.9  39.0 - 50.0 fL Final   • Extra Tube 12/30/2020 Hold for Add Ons   Final   • Extra Tube 12/30/2020 Hold for Add Ons   Final   • Extra Tube 12/30/2020 Hold for Add Ons   Final   • Extra Tube 12/30/2020 Hold for Add Ons   Final   • Extra Tube 12/30/2020 Hold for Add Ons   Final   • Extra Tube 12/30/2020 Hold for Add Ons   Final   • Sodium 12/30/2020 133* 135 - 145 mmol/L Final   • Potassium 12/30/2020 4.3  3.4 - 5.1 mmol/L Final   • Chloride 12/30/2020 97* 98 - 107 mmol/L Final   • Carbon Dioxide 12/30/2020 32  21 - 32 mmol/L Final   • Anion Gap 12/30/2020 8* 10 - 20 mmol/L Final   • Glucose 12/30/2020 99  65 - 99 mg/dL Final   • BUN 12/30/2020 26* 6 - 20 mg/dL Final   • Creatinine 12/30/2020 1.41* 0.67 - 1.17 mg/dL Final   • Glomerular Filtration Rate 12/30/2020 45* >90 mL/min/1.73m2 Final    eGFR 30-59 mL/min/1.73m2 = Moderate decrease in kidney function. Stage 3 CKD (chronic kidney disease) or moderate kidney disease.   • BUN/ Creatinine Ratio 12/30/2020 18  7 - 25 Final 
  • Calcium 12/30/2020 9.6  8.4 - 10.2 mg/dL Final   • Bilirubin, Total 12/30/2020 0.6  0.2 - 1.0 mg/dL Final   • GOT/AST 12/30/2020 25  <=37 Units/L Final   • GPT/ALT 12/30/2020 23  <64 Units/L Final   • Alkaline Phosphatase 12/30/2020 110  45 - 117 Units/L Final   • Albumin 12/30/2020 2.9* 3.6 - 5.1 g/dL Final   • Protein, Total 12/30/2020 7.4  6.4 - 8.2 g/dL Final   • Globulin 12/30/2020 4.5* 2.0 - 4.0 g/dL Final   • A/G Ratio 12/30/2020 0.6* 1.0 - 2.4 Final   • Lactate, Venous 12/30/2020 1.0  0.0 - 2.0 mmol/L Final   • NT-proBNP 12/30/2020 7,488* <=450 pg/mL Final   • Troponin I, Ultra Sensitive 12/30/2020 0.41* <=0.04 ng/mL Final    -;[id number] CALLED CRITICAL RESULTS ON 12/30/2020 @ 1952 TO AND READ BACK BY DAYANA VARGAS RN  in ER  Consistent with myocardial injury.  Mild elevations of  troponin may indicate noninfarction cardiac injury or early myocardial infarction.  Serial studies may be helpful.   • Sodium 12/31/2020 137  135 - 145 mmol/L Final   • Potassium 12/31/2020 4.0  3.4 - 5.1 mmol/L Final   • Chloride 12/31/2020 102  98 - 107 mmol/L Final   • Carbon Dioxide 12/31/2020 28  21 - 32 mmol/L Final   • Anion Gap 12/31/2020 11  10 - 20 mmol/L Final   • Glucose 12/31/2020 86  65 - 99 mg/dL Final   • BUN 12/31/2020 20  6 - 20 mg/dL Final   • Creatinine 12/31/2020 1.01  0.67 - 1.17 mg/dL Final   • Glomerular Filtration Rate 12/31/2020 68* >90 mL/min/1.73m2 Final    eGFR 60 - 89 mL/min/1.73m2 = Mild decrease in kidney function.   • BUN/ Creatinine Ratio 12/31/2020 20  7 - 25 Final   • Calcium 12/31/2020 9.5  8.4 - 10.2 mg/dL Final   • Bilirubin, Total 12/31/2020 0.6  0.2 - 1.0 mg/dL Final   • GOT/AST 12/31/2020 19  <=37 Units/L Final   • GPT/ALT 12/31/2020 19  <64 Units/L Final   • Alkaline Phosphatase 12/31/2020 99  45 - 117 Units/L Final   • Albumin 12/31/2020 2.8* 3.6 - 5.1 g/dL Final   • Protein, Total 12/31/2020 7.4  6.4 - 8.2 g/dL Final   • Globulin 12/31/2020 4.6* 2.0 - 4.0 g/dL Final   • A/G Ratio 
12/31/2020 0.6* 1.0 - 2.4 Final   • WBC 12/31/2020 7.7  4.2 - 11.0 K/mcL Final   • RBC 12/31/2020 3.72* 4.50 - 5.90 mil/mcL Final   • HGB 12/31/2020 11.5* 13.0 - 17.0 g/dL Final   • HCT 12/31/2020 35.5* 39.0 - 51.0 % Final   • MCV 12/31/2020 95.4  78.0 - 100.0 fl Final   • MCH 12/31/2020 30.9  26.0 - 34.0 pg Final   • MCHC 12/31/2020 32.4  32.0 - 36.5 g/dL Final   • RDW-CV 12/31/2020 13.8  11.0 - 15.0 % Final   • PLT 12/31/2020 153  140 - 450 K/mcL Final   • NRBC 12/31/2020 0  <=0 /100 WBC Final   • Neutrophil, Percent 12/31/2020 76  % Final   • Lymphocytes, Percent 12/31/2020 8  % Final   • Mono, Percent 12/31/2020 11  % Final   • Eosinophils, Percent 12/31/2020 4  % Final   • Basophils, Percent 12/31/2020 0  % Final   • Immature Granulocytes 12/31/2020 1  % Final   • Absolute Neutrophils 12/31/2020 5.9  1.8 - 7.7 K/mcL Final   • Absolute Lymphocytes 12/31/2020 0.6* 1.0 - 4.0 K/mcL Final   • Absolute Monocytes 12/31/2020 0.8  0.3 - 0.9 K/mcL Final   • Absolute Eosinophils  12/31/2020 0.3  0.0 - 0.5 K/mcL Final   • Absolute Basophils 12/31/2020 0.0  0.0 - 0.3 K/mcL Final   • Absolute Immmature Granulocytes 12/31/2020 0.0  0.0 - 0.2 K/mcL Final   • RDW-SD 12/31/2020 48.5  39.0 - 50.0 fL Final   • MRSA PCR 12/31/2020 Not Detected  Not Detected Final    This assay is FDA approved for use with nasal swabs. Performance characteristics of this assay using blood, axilla, groin, or combined nares/groin specimens were determined by the [facility] Microbiology Department. This test has not been cleared or approved by the   U.S. Food and Drug Administration for use with blood, axilla, groin, or combined nares/groin specimens. This test is used for clinical purposes. It should not be regarded as investigational or for research. This laboratory is certified under the Clinical Laboratory Improvement Amendments of 1988 (CLIA) as qualified to perform high complexity clinical laboratory testing.   • Magnesium 12/31/2020 1.9  
1.7 - 2.4 mg/dL Final   • Procalcitonin 12/31/2020 0.83* <=0.09 ng/mL Final      Bacterial Sepsis:  <0.5 ng/mL:    Sepsis not likely. Localized bacterial infection possible.  0.5 - 2 ng/mL: Sepsis or other conditions possible  >2.0 ng/mL:    High risk of sepsis/septic shock    NOTE: If bacterial sepsis is of high clinical suspicion but PCT<2 ng/mL,  consider recheck PCT 6-24 hours after initial test.     Lower Respiratory Tract Infection (LRTI):  <0.1 ng/mL:       Bacterial infection highly unlikely  0.1 - 0.25 ng/mL: Bacterial infection unlikely  0.26 - 0.5 ng/mL: Bacterial infection likely  > 0.5 ng/mL:      Bacterial infection highly likely    NOTE: Patients with advanced CKD or medical/surgical trauma may have  elevated baseline PCT (>0.5 ng/mL) in the absence of bacterial infection. If  bacterial infection is suspected, consider repeat PCT in 2 to 4 days to guide de-escalation or discontinuation of antibiotic therapy.  Higher reference range cutoffs may be appropriate for patients <3 days old.     • NT-proBNP 12/31/2020 4,868* <=450 pg/mL Final   • Extra Tube 12/31/2020 Hold for Add Ons   Final   • Extra Tube 12/31/2020 Hold for Add Ons   Final   • Extra Tube 12/31/2020 Hold for Add Ons   Final   • Extra Tube 12/31/2020 Hold for Add Ons   Final   • Troponin I, Ultra Sensitive 12/31/2020 0.37* <=0.04 ng/mL Final    -;[id number] CALLED CRITICAL RESULTS ON 12/31/2020 @ 0920 TO AND READ BACK BY NURIS ALDRICH in [facility]   Consistent with myocardial injury.  Mild elevations of  troponin may indicate noninfarction cardiac injury or early myocardial infarction.  Serial studies may be helpful.   • Cholesterol 01/01/2021 105  <=199 mg/dL Final    Desirable         <200  Borderline High   200 to 239  High              >=240   • Triglycerides 01/01/2021 91  <=149 mg/dL Final    Normal            <150  Borderline High   150 to 199  High              200 to 499  Very High         >=500   • HDL 01/01/2021 45  >=40 mg/dL Final 
Detail Level: Zone
   Low              <40  Borderline Low   40 to 49  Near Optimal     50 to 59  Optimal          >=60   • LDL 01/01/2021 42  <=129 mg/dL Final    OPTIMAL           <100  NEAR OPTIMAL      100 to 129  BORDERLINE HIGH   130 to 159  HIGH              160 to 189  VERY HIGH         >=190   • Non-HDL Cholesterol 01/01/2021 60  mg/dL Final    Therapeutic Target:  CHD and risk equivalents  <130  Multiple risk factors     <160  0 to 1 risk factor        <190   • Cholesterol/ HDL Ratio 01/01/2021 2.3  <=4.4 Final   • Magnesium 01/01/2021 2.3  1.7 - 2.4 mg/dL Final   • Troponin I, Ultra Sensitive 01/01/2021 0.26* <=0.04 ng/mL Final    -;[id number] CALLED CRITICAL RESULTS ON 01/01/2021 @ 0619 TO AND READ BACK BY KENDALL MANCILLA in 1310  Consistent with myocardial injury.  Mild elevations of  troponin may indicate noninfarction cardiac injury or early myocardial infarction.  Serial studies may be helpful.   • Sodium 01/01/2021 140  135 - 145 mmol/L Final   • Potassium 01/01/2021 4.5  3.4 - 5.1 mmol/L Final   • Chloride 01/01/2021 104  98 - 107 mmol/L Final   • Carbon Dioxide 01/01/2021 27  21 - 32 mmol/L Final   • Anion Gap 01/01/2021 14  10 - 20 mmol/L Final   • Glucose 01/01/2021 139* 65 - 99 mg/dL Final   • BUN 01/01/2021 33* 6 - 20 mg/dL Final   • Creatinine 01/01/2021 1.81* 0.67 - 1.17 mg/dL Final   • Glomerular Filtration Rate 01/01/2021 34* >90 mL/min/1.73m2 Final    eGFR 30-59 mL/min/1.73m2 = Moderate decrease in kidney function. Stage 3 CKD (chronic kidney disease) or moderate kidney disease.   • BUN/ Creatinine Ratio 01/01/2021 18  7 - 25 Final   • Calcium 01/01/2021 9.4  8.4 - 10.2 mg/dL Final   • Bilirubin, Total 01/01/2021 0.5  0.2 - 1.0 mg/dL Final   • GOT/AST 01/01/2021 41* <=37 Units/L Final   • GPT/ALT 01/01/2021 19  <64 Units/L Final   • Alkaline Phosphatase 01/01/2021 105  45 - 117 Units/L Final   • Albumin 01/01/2021 2.7* 3.6 - 5.1 g/dL Final   • Protein, Total 01/01/2021 7.5  6.4 - 8.2 g/dL Final   • Globulin 
Erythema: mild
01/01/2021 4.8* 2.0 - 4.0 g/dL Final   • A/G Ratio 01/01/2021 0.6* 1.0 - 2.4 Final   • WBC 01/01/2021 12.5* 4.2 - 11.0 K/mcL Final   • RBC 01/01/2021 4.12* 4.50 - 5.90 mil/mcL Final   • HGB 01/01/2021 12.6* 13.0 - 17.0 g/dL Final   • HCT 01/01/2021 39.0  39.0 - 51.0 % Final   • MCV 01/01/2021 94.7  78.0 - 100.0 fl Final   • MCH 01/01/2021 30.6  26.0 - 34.0 pg Final   • MCHC 01/01/2021 32.3  32.0 - 36.5 g/dL Final   • RDW-CV 01/01/2021 13.6  11.0 - 15.0 % Final   • PLT 01/01/2021 150  140 - 450 K/mcL Final    Platelet count verified by smear review   • NRBC 01/01/2021 0  <=0 /100 WBC Final   • Neutrophil, Percent 01/01/2021 88  % Final   • Lymphocytes, Percent 01/01/2021 2  % Final   • Mono, Percent 01/01/2021 9  % Final   • Eosinophils, Percent 01/01/2021 0  % Final   • Basophils, Percent 01/01/2021 0  % Final   • Immature Granulocytes 01/01/2021 1  % Final   • Absolute Neutrophils 01/01/2021 11.0* 1.8 - 7.7 K/mcL Final   • Absolute Lymphocytes 01/01/2021 0.3* 1.0 - 4.0 K/mcL Final   • Absolute Monocytes 01/01/2021 1.1* 0.3 - 0.9 K/mcL Final   • Absolute Eosinophils  01/01/2021 0.0  0.0 - 0.5 K/mcL Final   • Absolute Basophils 01/01/2021 0.0  0.0 - 0.3 K/mcL Final   • Absolute Immmature Granulocytes 01/01/2021 0.1  0.0 - 0.2 K/mcL Final   • RDW-SD 01/01/2021 47.1  39.0 - 50.0 fL Final   • WBC Morphology 01/01/2021 Normal  Normal Final   • Ovalocytes 01/01/2021 Few   Final   • Platelet Morphology 01/01/2021 Normal  Normal Final         Imaging     follow-up echo December 31  1. Left ventricle: The cavity size is normal. Wall thickness is normal.     The ejection fraction was measured by visual estimation. The ejection     fraction is 30%.  2. Regional wall motion abnormalities: Akinesis of the basal-mid inferior     myocardium; hypokinesis of the basal inferolateral myocardium.  3. Aortic valve: Transvalvular velocity is increased, due to stenosis.     There is severe stenosis. The valve area by the velocity-time 
Treatment Number: 0
integral     method is 0.8cm^2.  4. Pulmonary arteries: Systolic pressure is severely increased, estimated     to be 70mm Hg.  Assessment & Plan   No problem-specific Assessment & Plan notes found for this encounter.  1.  Admitted to emergency room on December 30 for shortness of breath, fever and hypoxemia  2.  chronic systolic left-sided congestive heart--reportedly nonischemic cardiomyopathy failure--echocardiogram done on August 31 showed EF 35 to 40% and moderate to severe aortic stenosis  Follow-up echo December 31 shows EF 30% and severe aortic stenosis  3.  History of paroxysmal atrial fibrillation  4.  History of bi V AICD--[employer] device  5. history of watchman MAXIMILIAN device  6.  Acute non-STEMI--type II from demand ischemia  7.  Acute renal insufficiency    Recommendations     1.  Monitor on telemetry  2.  Continue aspirin 81 mg daily   3.  continue amiodarone  4.  Hold Lasix and Vasotec due to elevated creatinine  5.  Continue metoprolol  6.    Follow-up echocardiogram shows severe aortic stenosis and EF 30% and moderate to severe pulmonary hypertension  7.  Treatment for pneumonia  8.  Consider right and left heart catheterization for evaluation of aortic stenosis once his respiratory status is stable    DVT Prophylaxis      Smoking Cessation  Counseling given: Not Answered     Care discussed with RN           
Number Of Coats: 2
Post-Care Instructions: I reviewed with the patient in detail post-care instructions. Patient should avoid sun exposure and wear sun protection.
Frost (0,1+,2+,3+,4+): 1+
Chemical Peel: 10% TCA
Prep: The treated area was degreased with pre-peel cleanser, and vaseline was applied for protection of mucous membranes.
Post Peel Care: After the procedure, the treatment area was washed with soap and water, and a post-peel cream was applied. Sun protection and post-care instructions were reviewed with the patient.

## 2021-02-11 ENCOUNTER — APPOINTMENT (RX ONLY)
Dept: URBAN - METROPOLITAN AREA CLINIC 47 | Facility: CLINIC | Age: 61
Setting detail: DERMATOLOGY
End: 2021-02-11

## 2021-02-11 DIAGNOSIS — L82.1 OTHER SEBORRHEIC KERATOSIS: ICD-10-CM

## 2021-02-11 DIAGNOSIS — Z41.9 ENCOUNTER FOR PROCEDURE FOR PURPOSES OTHER THAN REMEDYING HEALTH STATE, UNSPECIFIED: ICD-10-CM

## 2021-02-11 DIAGNOSIS — B07.8 OTHER VIRAL WARTS: ICD-10-CM

## 2021-02-11 DIAGNOSIS — L71.8 OTHER ROSACEA: ICD-10-CM

## 2021-02-11 DIAGNOSIS — L57.0 ACTINIC KERATOSIS: ICD-10-CM

## 2021-02-11 PROCEDURE — 17003 DESTRUCT PREMALG LES 2-14: CPT | Mod: 59

## 2021-02-11 PROCEDURE — ? TCA CHEMICAL PEEL

## 2021-02-11 PROCEDURE — 17111 DESTRUCTION B9 LESIONS 15/>: CPT

## 2021-02-11 PROCEDURE — 99213 OFFICE O/P EST LOW 20 MIN: CPT | Mod: 25

## 2021-02-11 PROCEDURE — 17000 DESTRUCT PREMALG LESION: CPT | Mod: 59

## 2021-02-11 PROCEDURE — ? COUNSELING

## 2021-02-11 PROCEDURE — ? LIQUID NITROGEN

## 2021-02-11 PROCEDURE — ? PRESCRIPTION

## 2021-02-11 RX ORDER — OXYMETAZOLINE HYDROCHLORIDE 1 G/100G
CREAM TOPICAL
Qty: 1 | Refills: 0 | Status: ERX

## 2021-02-11 ASSESSMENT — LOCATION DETAILED DESCRIPTION DERM
LOCATION DETAILED: RIGHT MEDIAL SUPERIOR CHEST
LOCATION DETAILED: LEFT CLAVICULAR SKIN
LOCATION DETAILED: RIGHT DISTAL DORSAL FOREARM
LOCATION DETAILED: LEFT MEDIAL SUPERIOR CHEST
LOCATION DETAILED: RIGHT INFERIOR CENTRAL MALAR CHEEK
LOCATION DETAILED: RIGHT DISTAL PRETIBIAL REGION
LOCATION DETAILED: LEFT CENTRAL MALAR CHEEK
LOCATION DETAILED: LEFT DISTAL DORSAL FOREARM
LOCATION DETAILED: LEFT CENTRAL MALAR CHEEK
LOCATION DETAILED: LEFT PROXIMAL PRETIBIAL REGION
LOCATION DETAILED: STERNAL NOTCH

## 2021-02-11 ASSESSMENT — LOCATION SIMPLE DESCRIPTION DERM
LOCATION SIMPLE: RIGHT PRETIBIAL REGION
LOCATION SIMPLE: LEFT CHEEK
LOCATION SIMPLE: RIGHT CHEEK
LOCATION SIMPLE: CHEST
LOCATION SIMPLE: LEFT CHEEK
LOCATION SIMPLE: LEFT PRETIBIAL REGION
LOCATION SIMPLE: RIGHT FOREARM
LOCATION SIMPLE: LEFT FOREARM
LOCATION SIMPLE: LEFT CLAVICULAR SKIN

## 2021-02-11 ASSESSMENT — LOCATION ZONE DERM
LOCATION ZONE: TRUNK
LOCATION ZONE: TRUNK
LOCATION ZONE: ARM
LOCATION ZONE: LEG
LOCATION ZONE: FACE
LOCATION ZONE: FACE

## 2021-02-11 NOTE — PROCEDURE: TCA CHEMICAL PEEL
Time (Mins): 2
Detail Level: Zone
Post Peel Care: After the procedure, the treatment area was washed with soap and water, and a post-peel cream was applied. Sun protection and post-care instructions were reviewed with the patient.
Erythema: mild
Post-Care Instructions: I reviewed with the patient in detail post-care instructions. Patient should avoid sun exposure and wear sun protection.
Prep: The treated area was degreased with pre-peel cleanser, and vaseline was applied for protection of mucous membranes.
Chemical Peel: 10% TCA
Consent: Prior to the procedure, written consent was obtained and risks were reviewed, including but not limited to: redness, peeling, blistering, pigmentary change, scarring, infection, and pain.
Treatment Number: 0
Frost (0,1+,2+,3+,4+): 1+

## 2021-02-11 NOTE — PROCEDURE: LIQUID NITROGEN
Render Note In Bullet Format When Appropriate: No
Duration Of Freeze Thaw-Cycle (Seconds): 5
Consent: The patient's consent was obtained including but not limited to risks of crusting, scabbing, blistering, scarring, darker or lighter pigmentary change, recurrence, incomplete removal and infection.
Post-Care Instructions: I reviewed with the patient in detail post-care instructions. Patient is to wear sunprotection, and avoid picking at any of the treated lesions. Pt may apply Vaseline to crusted or scabbing areas.
Number Of Freeze-Thaw Cycles: 1 freeze-thaw cycle
Detail Level: Zone
Total Number Of Lesions Treated: [id number] José Luis Gómez
Render Post Care In The Note?: yes
Medical Necessity Information: It is in your best interest to select a reason for this procedure from the list below. All of these items fulfill various CMS LCD requirements except the new and changing color options.
Number Of Freeze-Thaw Cycles: 5 freeze-thaw cycles
Medical Necessity Clause: This procedure was medically necessary because the lesions that were treated were:

## 2021-06-16 ENCOUNTER — APPOINTMENT (RX ONLY)
Dept: URBAN - METROPOLITAN AREA CLINIC 47 | Facility: CLINIC | Age: 61
Setting detail: DERMATOLOGY
End: 2021-06-16

## 2021-06-16 DIAGNOSIS — L71.8 OTHER ROSACEA: ICD-10-CM

## 2021-06-16 DIAGNOSIS — L81.4 OTHER MELANIN HYPERPIGMENTATION: ICD-10-CM

## 2021-06-16 DIAGNOSIS — D22 MELANOCYTIC NEVI: ICD-10-CM

## 2021-06-16 DIAGNOSIS — B07.8 OTHER VIRAL WARTS: ICD-10-CM

## 2021-06-16 DIAGNOSIS — Z41.9 ENCOUNTER FOR PROCEDURE FOR PURPOSES OTHER THAN REMEDYING HEALTH STATE, UNSPECIFIED: ICD-10-CM

## 2021-06-16 PROBLEM — D22.9 MELANOCYTIC NEVI, UNSPECIFIED: Status: ACTIVE | Noted: 2021-06-16

## 2021-06-16 PROCEDURE — ? LIQUID NITROGEN

## 2021-06-16 PROCEDURE — ? TCA CHEMICAL PEEL

## 2021-06-16 PROCEDURE — 99213 OFFICE O/P EST LOW 20 MIN: CPT | Mod: 25

## 2021-06-16 PROCEDURE — 17111 DESTRUCTION B9 LESIONS 15/>: CPT

## 2021-06-16 PROCEDURE — ? PRESCRIPTION

## 2021-06-16 PROCEDURE — ? COUNSELING

## 2021-06-16 PROCEDURE — ? COSMETIC CONSULTATION - PULSED-DYE LASER

## 2021-06-16 ASSESSMENT — LOCATION DETAILED DESCRIPTION DERM
LOCATION DETAILED: LEFT DISTAL DORSAL FOREARM
LOCATION DETAILED: LEFT PROXIMAL PRETIBIAL REGION
LOCATION DETAILED: LEFT CENTRAL MALAR CHEEK
LOCATION DETAILED: RIGHT INFERIOR CENTRAL MALAR CHEEK
LOCATION DETAILED: RIGHT DISTAL DORSAL FOREARM
LOCATION DETAILED: LEFT CENTRAL MALAR CHEEK
LOCATION DETAILED: RIGHT DISTAL PRETIBIAL REGION

## 2021-06-16 ASSESSMENT — LOCATION SIMPLE DESCRIPTION DERM
LOCATION SIMPLE: RIGHT PRETIBIAL REGION
LOCATION SIMPLE: LEFT CHEEK
LOCATION SIMPLE: RIGHT CHEEK
LOCATION SIMPLE: LEFT FOREARM
LOCATION SIMPLE: LEFT CHEEK
LOCATION SIMPLE: RIGHT FOREARM
LOCATION SIMPLE: LEFT PRETIBIAL REGION

## 2021-06-16 ASSESSMENT — LOCATION ZONE DERM
LOCATION ZONE: LEG
LOCATION ZONE: FACE
LOCATION ZONE: FACE
LOCATION ZONE: ARM

## 2021-06-16 NOTE — PROCEDURE: LIQUID NITROGEN
Duration Of Freeze Thaw-Cycle (Seconds): 5
Total Number Of Lesions Treated: 21
Render In Bullet Format When Appropriate: No
Render Post Care In The Note?: yes
Medical Necessity Information: It is in your best interest to select a reason for this procedure from the list below. All of these items fulfill various CMS LCD requirements except the new and changing color options.
Number Of Freeze-Thaw Cycles: 5 freeze-thaw cycles
Consent: The patient's consent was obtained including but not limited to risks of crusting, scabbing, blistering, scarring, darker or lighter pigmentary change, recurrence, incomplete removal and infection.
Medical Necessity Clause: This procedure was medically necessary because the lesions that were treated were:
Detail Level: Zone
Post-Care Instructions: I reviewed with the patient in detail post-care instructions. Patient is to wear sunprotection, and avoid picking at any of the treated lesions. Pt may apply Vaseline to crusted or scabbing areas.

## 2021-06-17 RX ORDER — AZELAIC ACID 0.15 G/G
GEL TOPICAL
Qty: 1 | Refills: 0 | Status: CANCELLED
Stop reason: SDUPTHER

## 2021-07-22 ENCOUNTER — APPOINTMENT (RX ONLY)
Dept: URBAN - METROPOLITAN AREA CLINIC 47 | Facility: CLINIC | Age: 61
Setting detail: DERMATOLOGY
End: 2021-07-22

## 2021-07-22 DIAGNOSIS — Z41.9 ENCOUNTER FOR PROCEDURE FOR PURPOSES OTHER THAN REMEDYING HEALTH STATE, UNSPECIFIED: ICD-10-CM

## 2021-07-22 PROCEDURE — ? PULSED-DYE LASER

## 2021-07-22 ASSESSMENT — LOCATION SIMPLE DESCRIPTION DERM
LOCATION SIMPLE: RIGHT CHEEK
LOCATION SIMPLE: LEFT CHEEK

## 2021-07-22 ASSESSMENT — LOCATION DETAILED DESCRIPTION DERM
LOCATION DETAILED: RIGHT CENTRAL MALAR CHEEK
LOCATION DETAILED: LEFT CENTRAL MALAR CHEEK

## 2021-07-22 ASSESSMENT — LOCATION ZONE DERM: LOCATION ZONE: FACE

## 2021-07-22 NOTE — PROCEDURE: PULSED-DYE LASER
Treated Area: large area
Fluence In J/Cm2 (Optional): 8
Delay Time (Ms): 6243 [facility]
Cryogen Time (Ms): [address]52 José Luis Gómez
Pulse Duration: 10 ms
Cryogen Time (Ms): 30
Pulse Duration: 6 ms
Spot Size: 7 mm
Delay Time (Ms): 20
Post-Procedure Care: Hydrocortisone 2.5% applied to treatment area.
Post-Care Instructions: I reviewed with the patient in detail post-care instructions. Patient should stay away from the sun and wear sun protection until treated areas are fully healed.
Detail Level: Zone
Immediate Endpoint: erythema
Consent: Written consent obtained, risks reviewed including but not limited to crusting, scabbing, blistering, scarring, darker or lighter pigmentary change, incidental hair removal, bruising, and/or incomplete removal. Patient quoted $250 for spot treatment.
Laser Type: Vbeam 595nm

## 2021-08-24 ENCOUNTER — APPOINTMENT (RX ONLY)
Dept: URBAN - METROPOLITAN AREA CLINIC 47 | Facility: CLINIC | Age: 61
Setting detail: DERMATOLOGY
End: 2021-08-24

## 2021-08-24 DIAGNOSIS — Z41.9 ENCOUNTER FOR PROCEDURE FOR PURPOSES OTHER THAN REMEDYING HEALTH STATE, UNSPECIFIED: ICD-10-CM

## 2021-08-24 PROCEDURE — ? PULSED-DYE LASER

## 2021-08-24 ASSESSMENT — LOCATION DETAILED DESCRIPTION DERM
LOCATION DETAILED: RIGHT CENTRAL MALAR CHEEK
LOCATION DETAILED: LEFT INFERIOR LATERAL MALAR CHEEK
LOCATION DETAILED: LEFT CHIN
LOCATION DETAILED: LEFT CENTRAL MALAR CHEEK
LOCATION DETAILED: LEFT CHIN
LOCATION DETAILED: LEFT CENTRAL MALAR CHEEK
LOCATION DETAILED: RIGHT MEDIAL MALAR CHEEK
LOCATION DETAILED: RIGHT INFERIOR CENTRAL MALAR CHEEK

## 2021-08-24 ASSESSMENT — LOCATION SIMPLE DESCRIPTION DERM
LOCATION SIMPLE: LEFT CHEEK
LOCATION SIMPLE: CHIN
LOCATION SIMPLE: RIGHT CHEEK
LOCATION SIMPLE: LEFT CHEEK
LOCATION SIMPLE: CHIN
LOCATION SIMPLE: RIGHT CHEEK

## 2021-08-24 ASSESSMENT — LOCATION ZONE DERM
LOCATION ZONE: FACE
LOCATION ZONE: FACE

## 2021-08-24 NOTE — PROCEDURE: PULSED-DYE LASER
Delay Time (Ms): [address] [facility]
Delay Time (Ms): 20
Consent: Written consent obtained, risks reviewed including but not limited to crusting, scabbing, blistering, scarring, darker or lighter pigmentary change, incidental hair removal, bruising, and/or incomplete removal. Patient quoted $250 for spot treatment.
Spot Size: 7 mm
Fluence In J/Cm2 (Optional): 8.5
Post-Procedure Care: Hydrocortisone 2.5% applied to treatment area.
Cryogen Time (Ms): [address] [address]
Pulse Duration: 10 ms
Fluence In J/Cm2 (Optional): 7.5
Cryogen Time (Ms): 30
Treated Area: medium area
Post-Care Instructions: I reviewed with the patient in detail post-care instructions. Patient should stay away from the sun and wear sun protection until treated areas are fully healed.
Immediate Endpoint: erythema
Detail Level: Zone
Pulse Duration: 6 ms
Laser Type: Vbeam 595nm

## 2021-09-09 ENCOUNTER — APPOINTMENT (RX ONLY)
Dept: URBAN - METROPOLITAN AREA CLINIC 47 | Facility: CLINIC | Age: 61
Setting detail: DERMATOLOGY
End: 2021-09-09

## 2021-09-09 DIAGNOSIS — L72.0 EPIDERMAL CYST: ICD-10-CM

## 2021-09-09 DIAGNOSIS — L71.8 OTHER ROSACEA: ICD-10-CM | Status: INADEQUATELY CONTROLLED

## 2021-09-09 DIAGNOSIS — Z41.9 ENCOUNTER FOR PROCEDURE FOR PURPOSES OTHER THAN REMEDYING HEALTH STATE, UNSPECIFIED: ICD-10-CM

## 2021-09-09 PROCEDURE — ? COUNSELING

## 2021-09-09 PROCEDURE — 99214 OFFICE O/P EST MOD 30 MIN: CPT | Mod: 25

## 2021-09-09 PROCEDURE — 11901 INJECT SKIN LESIONS >7: CPT

## 2021-09-09 PROCEDURE — ? INTRALESIONAL KENALOG

## 2021-09-09 PROCEDURE — ? TCA CHEMICAL PEEL

## 2021-09-09 PROCEDURE — ? PRESCRIPTION

## 2021-09-09 RX ORDER — MINOCYCLINE HYDROCHLORIDE 45 MG/1
1 TABLET, EXTENDED RELEASE ORAL
Qty: 30 | Refills: 0 | Status: ERX | COMMUNITY
Start: 2021-09-09

## 2021-09-09 RX ORDER — METRONIDAZOLE 10 MG/G
1 GEL TOPICAL QD
Qty: 60 | Refills: 0 | Status: ERX | COMMUNITY
Start: 2021-09-09

## 2021-09-09 RX ORDER — AZELAIC ACID 0.15 G/G
1 GEL TOPICAL QAM
Qty: 50 | Refills: 0 | Status: ERX | COMMUNITY
Start: 2021-09-09

## 2021-09-09 RX ADMIN — METRONIDAZOLE 1: 10 GEL TOPICAL at 00:00

## 2021-09-09 RX ADMIN — MINOCYCLINE HYDROCHLORIDE 1: 45 TABLET, EXTENDED RELEASE ORAL at 00:00

## 2021-09-09 RX ADMIN — AZELAIC ACID 1: 0.15 GEL TOPICAL at 00:00

## 2021-09-09 ASSESSMENT — LOCATION SIMPLE DESCRIPTION DERM
LOCATION SIMPLE: GLABELLA
LOCATION SIMPLE: RIGHT FOREHEAD
LOCATION SIMPLE: INFERIOR FOREHEAD
LOCATION SIMPLE: LEFT CHEEK
LOCATION SIMPLE: RIGHT CHEEK
LOCATION SIMPLE: LEFT FOREHEAD

## 2021-09-09 ASSESSMENT — LOCATION DETAILED DESCRIPTION DERM
LOCATION DETAILED: LEFT INFERIOR MEDIAL FOREHEAD
LOCATION DETAILED: LEFT FOREHEAD
LOCATION DETAILED: RIGHT INFERIOR FOREHEAD
LOCATION DETAILED: RIGHT FOREHEAD
LOCATION DETAILED: RIGHT CENTRAL MALAR CHEEK
LOCATION DETAILED: LEFT INFERIOR FOREHEAD
LOCATION DETAILED: RIGHT INFERIOR MEDIAL FOREHEAD
LOCATION DETAILED: INFERIOR MID FOREHEAD
LOCATION DETAILED: LEFT CENTRAL MALAR CHEEK
LOCATION DETAILED: GLABELLA

## 2021-09-09 ASSESSMENT — LOCATION ZONE DERM: LOCATION ZONE: FACE

## 2021-09-09 NOTE — PROCEDURE: INTRALESIONAL KENALOG
Consent: The risks of atrophy were reviewed with the patient.
Validate Note Data When Using Inventory: Yes
Total Volume Injected (Ccs- Only Use Numbers And Decimals): 0.3
X Size Of Lesion In Cm (Optional): 0
Medical Necessity Clause: This procedure was medically necessary because the lesions that were treated were:
Detail Level: Simple
Kenalog Preparation: Kenalog
Administered By (Optional): Tatiana Doe
Include Z78.9 (Other Specified Conditions Influencing Health Status) As An Associated Diagnosis?: No
Ndc# For Kenalog Only: [id number]
Concentration Of Solution Injected (Mg/Ml): 3.0

## 2021-09-09 NOTE — PROCEDURE: TCA CHEMICAL PEEL
Time (Mins): 2
Consent: Prior to the procedure, written consent was obtained and risks were reviewed, including but not limited to: redness, peeling, blistering, pigmentary change, scarring, infection, and pain.
Post-Care Instructions: I reviewed with the patient in detail post-care instructions. Patient should avoid sun exposure and wear sun protection.
Number Of Coats: 3
Frost (0,1+,2+,3+,4+): 0
Prep: The treated area was degreased with pre-peel cleanser, and vaseline was applied for protection of mucous membranes.
Post Peel Care: After the procedure, the treatment area was washed with soap and water, and a post-peel cream was applied. Sun protection and post-care instructions were reviewed with the patient.  Chemical peel done by HYACINTH
Chemical Peel: 15% TCA
Erythema: mild
Detail Level: Zone

## 2021-09-20 ENCOUNTER — APPOINTMENT (RX ONLY)
Dept: URBAN - METROPOLITAN AREA CLINIC 47 | Facility: CLINIC | Age: 61
Setting detail: DERMATOLOGY
End: 2021-09-20

## 2021-09-20 DIAGNOSIS — Z41.9 ENCOUNTER FOR PROCEDURE FOR PURPOSES OTHER THAN REMEDYING HEALTH STATE, UNSPECIFIED: ICD-10-CM

## 2021-09-20 PROCEDURE — ? PULSED-DYE LASER

## 2021-09-20 ASSESSMENT — LOCATION ZONE DERM: LOCATION ZONE: FACE

## 2021-09-20 ASSESSMENT — LOCATION SIMPLE DESCRIPTION DERM
LOCATION SIMPLE: RIGHT CHEEK
LOCATION SIMPLE: LEFT CHEEK

## 2021-09-20 ASSESSMENT — LOCATION DETAILED DESCRIPTION DERM
LOCATION DETAILED: RIGHT CENTRAL MALAR CHEEK
LOCATION DETAILED: LEFT CENTRAL MALAR CHEEK

## 2021-09-20 NOTE — PROCEDURE: PULSED-DYE LASER
Cryogen Time (Ms): [address] [address]
Fluence In J/Cm2 (Optional): 8.0
Cryogen Time (Ms): 30
Pulse Duration: 10 ms
Spot Size: 7 mm
Delay Time (Ms): 18[address] [facility]
Delay Time (Ms): 20
Post-Procedure Care: Hydrocortisone 2.5% applied to treatment area.
Spot Size Override: 6 mm
Post-Care Instructions: I reviewed with the patient in detail post-care instructions. Patient should stay away from the sun and wear sun protection until treated areas are fully healed.
Treated Area: large area
Cryogen Time (Ms): 27
Laser Type: Vbeam 595nm
Pulse Duration: 6 ms
Detail Level: Zone
Immediate Endpoint: erythema
Fluence In J/Cm2 (Optional): 8.5
Consent: Written consent obtained, risks reviewed including but not limited to crusting, scabbing, blistering, scarring, darker or lighter pigmentary change, incidental hair removal, bruising, and/or incomplete removal. Patient quoted $250 for spot treatment.

## 2021-10-13 ENCOUNTER — APPOINTMENT (RX ONLY)
Dept: URBAN - METROPOLITAN AREA CLINIC 47 | Facility: CLINIC | Age: 61
Setting detail: DERMATOLOGY
End: 2021-10-13

## 2021-10-13 DIAGNOSIS — L71.8 OTHER ROSACEA: ICD-10-CM

## 2021-10-13 PROCEDURE — ? COUNSELING

## 2021-10-13 PROCEDURE — ? PRESCRIPTION MEDICATION MANAGEMENT

## 2021-10-13 PROCEDURE — 99213 OFFICE O/P EST LOW 20 MIN: CPT

## 2021-10-13 NOTE — PROCEDURE: MIPS QUALITY
Quality 110: Preventive Care And Screening: Influenza Immunization: Influenza Immunization previously received during influenza season
Quality 226: Preventive Care And Screening: Tobacco Use: Screening And Cessation Intervention: Patient screened for tobacco use and is an ex/non-smoker
Detail Level: Detailed
Quality 130: Documentation Of Current Medications In The Medical Record: Current Medications Documented
Quality 431: Preventive Care And Screening: Unhealthy Alcohol Use - Screening: Patient identified as an unhealthy alcohol user when screened for unhealthy alcohol use using a systematic screening method and received brief counseling

## 2021-10-13 NOTE — PROCEDURE: PRESCRIPTION MEDICATION MANAGEMENT
Render In Strict Bullet Format?: No
Continue Regimen: CCR of azelaic acid, metrogel and VBeam txs
Detail Level: Zone

## 2021-10-19 ENCOUNTER — APPOINTMENT (RX ONLY)
Dept: URBAN - METROPOLITAN AREA CLINIC 47 | Facility: CLINIC | Age: 61
Setting detail: DERMATOLOGY
End: 2021-10-19

## 2021-10-19 DIAGNOSIS — Z41.9 ENCOUNTER FOR PROCEDURE FOR PURPOSES OTHER THAN REMEDYING HEALTH STATE, UNSPECIFIED: ICD-10-CM

## 2021-10-19 PROCEDURE — ? PULSED-DYE LASER

## 2021-10-19 ASSESSMENT — LOCATION ZONE DERM
LOCATION ZONE: FACE
LOCATION ZONE: NOSE

## 2021-10-19 ASSESSMENT — LOCATION DETAILED DESCRIPTION DERM
LOCATION DETAILED: RIGHT CENTRAL MALAR CHEEK
LOCATION DETAILED: LEFT CENTRAL MALAR CHEEK
LOCATION DETAILED: NASAL SUPRATIP

## 2021-10-19 ASSESSMENT — LOCATION SIMPLE DESCRIPTION DERM
LOCATION SIMPLE: LEFT CHEEK
LOCATION SIMPLE: NOSE
LOCATION SIMPLE: RIGHT CHEEK

## 2021-10-19 NOTE — PROCEDURE: PULSED-DYE LASER
Fluence In J/Cm2 (Optional): 8.5
Immediate Endpoint: erythema
Cryogen Time (Ms): [address] [address]
Laser Type: Vbeam 595nm
Spot Size: 7 mm
Delay Time (Ms): 5433 [facility]
Cryogen Time (Ms): 30
Consent: Written consent obtained, risks reviewed including but not limited to crusting, scabbing, blistering, scarring, darker or lighter pigmentary change, incidental hair removal, bruising, and/or incomplete removal. Patient quoted $250 for spot treatment.
Fluence In J/Cm2 (Optional): 8.0
Delay Time (Ms): 20
Post-Care Instructions: I reviewed with the patient in detail post-care instructions. Patient should stay away from the sun and wear sun protection until treated areas are fully healed.
Spot Size Override: 6 mm
Pulse Duration: 6 ms
Pulse Duration: 10 ms
Detail Level: Zone
Treated Area: medium area
Post-Procedure Care: Hydrocortisone 2.5% applied to treatment area.

## 2022-10-20 ENCOUNTER — APPOINTMENT (RX ONLY)
Dept: URBAN - METROPOLITAN AREA CLINIC 46 | Facility: CLINIC | Age: 62
Setting detail: DERMATOLOGY
End: 2022-10-20

## 2023-01-11 ENCOUNTER — APPOINTMENT (RX ONLY)
Dept: URBAN - METROPOLITAN AREA CLINIC 47 | Facility: CLINIC | Age: 63
Setting detail: DERMATOLOGY
End: 2023-01-11

## 2023-01-11 DIAGNOSIS — Z41.9 ENCOUNTER FOR PROCEDURE FOR PURPOSES OTHER THAN REMEDYING HEALTH STATE, UNSPECIFIED: ICD-10-CM

## 2023-01-11 PROCEDURE — ? OTHER (COSMETIC)

## 2023-01-11 PROCEDURE — ? MIXTO PRO

## 2023-01-11 PROCEDURE — ? PATIENT SPECIFIC COUNSELING

## 2023-01-11 ASSESSMENT — LOCATION SIMPLE DESCRIPTION DERM: LOCATION SIMPLE: NOSE

## 2023-01-11 ASSESSMENT — LOCATION ZONE DERM: LOCATION ZONE: NOSE

## 2023-01-11 ASSESSMENT — LOCATION DETAILED DESCRIPTION DERM: LOCATION DETAILED: NASAL DORSUM

## 2023-01-11 NOTE — PROCEDURE: MIXTO PRO
Density (Will Not Render If 0): 34[address] [facility]
Santiago (Will Not Render If 0): 10
Density (Will Not Render If 0): 20
Consent: Written consent obtained, risks reviewed including but not limited to pain and incomplete improvement.
Santiago (Will Not Render If 0): 0
Santiago (Will Not Render If 0): 15
Topical Anesthesia Type: 20% benzocaine, 8% lidocaine, 4% tetracaine
Index: 8
Number Of Passes (Will Not Render If 0): 2
Spot Size: 300 micrometers
Indication: resurfacing
Add Post-Care Below To The Note: Yes
External Cooling Fan Speed: 5
Spot Size: 180 micrometers
Number Of Passes (Will Not Render If 0): 1
Length Of Topical Anesthesia Application (Optional): 60 minutes
Detail Level: Generalized
Santiago (Will Not Render If 0): [address] [facility] [address] [facility]mez
Location: nose
Post-Care Instructions: I reviewed with the patient in detail post-care instructions. Patient should avoid sun until area fully healed.

## 2023-03-27 ENCOUNTER — APPOINTMENT (RX ONLY)
Dept: URBAN - METROPOLITAN AREA CLINIC 47 | Facility: CLINIC | Age: 63
Setting detail: DERMATOLOGY
End: 2023-03-27

## 2023-03-27 DIAGNOSIS — Z41.9 ENCOUNTER FOR PROCEDURE FOR PURPOSES OTHER THAN REMEDYING HEALTH STATE, UNSPECIFIED: ICD-10-CM

## 2023-03-27 PROCEDURE — ? OTHER (COSMETIC)

## 2023-03-27 PROCEDURE — ? MIXTO PRO

## 2023-03-27 ASSESSMENT — LOCATION SIMPLE DESCRIPTION DERM: LOCATION SIMPLE: NOSE

## 2023-03-27 ASSESSMENT — LOCATION ZONE DERM: LOCATION ZONE: NOSE

## 2023-03-27 ASSESSMENT — LOCATION DETAILED DESCRIPTION DERM: LOCATION DETAILED: NASAL DORSUM

## 2023-03-27 NOTE — PROCEDURE: MIXTO PRO
Density (Will Not Render If 0): [address] [facility]
Santiago (Will Not Render If 0): 10
Density (Will Not Render If 0): 20
Consent: Written consent obtained, risks reviewed including but not limited to pain and incomplete improvement.
Santiago (Will Not Render If 0): 0
Santiago (Will Not Render If 0): 15
Topical Anesthesia Type: 20% benzocaine, 8% lidocaine, 4% tetracaine
Index: 8
Number Of Passes (Will Not Render If 0): 2
Spot Size: 300 micrometers
Indication: resurfacing
Add Post-Care Below To The Note: Yes
External Cooling Fan Speed: 5
Spot Size: 180 micrometers
Number Of Passes (Will Not Render If 0): 1
Length Of Topical Anesthesia Application (Optional): 60 minutes
Detail Level: Generalized
Location: nose
Post-Care Instructions: I reviewed with the patient in detail post-care instructions. Patient should avoid sun until area fully healed.

## 2023-08-16 ENCOUNTER — APPOINTMENT (RX ONLY)
Dept: URBAN - METROPOLITAN AREA CLINIC 47 | Facility: CLINIC | Age: 63
Setting detail: DERMATOLOGY
End: 2023-08-16

## 2023-08-16 DIAGNOSIS — Z41.9 ENCOUNTER FOR PROCEDURE FOR PURPOSES OTHER THAN REMEDYING HEALTH STATE, UNSPECIFIED: ICD-10-CM

## 2023-08-16 PROCEDURE — ? PULSED-DYE LASER

## 2023-08-16 NOTE — PROCEDURE: PULSED-DYE LASER
Pulse Count (Location 2): 518
Cryogen Time (Ms): 30
Pulse Duration: 10 ms
Location Override: full face
Fluence In J/Cm2 (Optional): 7
Delay Time (Ms): 20
Spot Size: 7 mm
Consent: Written consent obtained, risks reviewed including but not limited to crusting, scabbing, blistering, scarring, darker or lighter pigmentary change, incidental hair removal, bruising, and/or incomplete removal. Patient quoted $250 for spot treatment.
Pulse Duration: 6 ms
Immediate Endpoint: erythema
Detail Level: Zone
Location Override: stacked cheeks, chin
Treated Area: large area
Post-Care Instructions: I reviewed with the patient in detail post-care instructions. Patient should stay away from the sun and wear sun protection until treated areas are fully healed.
Laser Type: Vbeam 595nm

## 2024-04-05 ENCOUNTER — APPOINTMENT (RX ONLY)
Dept: URBAN - METROPOLITAN AREA CLINIC 47 | Facility: CLINIC | Age: 64
Setting detail: DERMATOLOGY
End: 2024-04-05

## 2024-04-05 DIAGNOSIS — Z41.9 ENCOUNTER FOR PROCEDURE FOR PURPOSES OTHER THAN REMEDYING HEALTH STATE, UNSPECIFIED: ICD-10-CM

## 2024-04-05 PROCEDURE — ? PULSED-DYE LASER

## 2024-04-05 ASSESSMENT — LOCATION SIMPLE DESCRIPTION DERM
LOCATION SIMPLE: RIGHT FOREHEAD
LOCATION SIMPLE: LEFT LIP
LOCATION SIMPLE: NOSE
LOCATION SIMPLE: RIGHT CHEEK
LOCATION SIMPLE: LEFT CHEEK

## 2024-04-05 ASSESSMENT — LOCATION ZONE DERM
LOCATION ZONE: FACE
LOCATION ZONE: NOSE
LOCATION ZONE: LIP

## 2024-04-05 ASSESSMENT — LOCATION DETAILED DESCRIPTION DERM
LOCATION DETAILED: LEFT CENTRAL MALAR CHEEK
LOCATION DETAILED: NASAL TIP
LOCATION DETAILED: RIGHT INFERIOR MEDIAL FOREHEAD
LOCATION DETAILED: RIGHT INFERIOR CENTRAL MALAR CHEEK
LOCATION DETAILED: LEFT LOWER CUTANEOUS LIP